# Patient Record
Sex: MALE
[De-identification: names, ages, dates, MRNs, and addresses within clinical notes are randomized per-mention and may not be internally consistent; named-entity substitution may affect disease eponyms.]

---

## 2023-08-24 ENCOUNTER — APPOINTMENT (OUTPATIENT)
Dept: SURGERY | Facility: CLINIC | Age: 38
End: 2023-08-24
Payer: COMMERCIAL

## 2023-08-24 VITALS
HEART RATE: 57 BPM | WEIGHT: 160 LBS | DIASTOLIC BLOOD PRESSURE: 70 MMHG | OXYGEN SATURATION: 98 % | HEIGHT: 70 IN | SYSTOLIC BLOOD PRESSURE: 116 MMHG | TEMPERATURE: 98.1 F | BODY MASS INDEX: 22.9 KG/M2

## 2023-08-24 DIAGNOSIS — Z78.9 OTHER SPECIFIED HEALTH STATUS: ICD-10-CM

## 2023-08-24 DIAGNOSIS — Z82.49 FAMILY HISTORY OF ISCHEMIC HEART DISEASE AND OTHER DISEASES OF THE CIRCULATORY SYSTEM: ICD-10-CM

## 2023-08-24 DIAGNOSIS — K42.9 UMBILICAL HERNIA W/OUT OBSTRUCTION OR GANGRENE: ICD-10-CM

## 2023-08-24 PROBLEM — Z00.00 ENCOUNTER FOR PREVENTIVE HEALTH EXAMINATION: Status: ACTIVE | Noted: 2023-08-24

## 2023-08-24 PROCEDURE — 99205 OFFICE O/P NEW HI 60 MIN: CPT

## 2023-08-24 RX ORDER — ALBUTEROL 90 MCG
AEROSOL (GRAM) INHALATION
Refills: 0 | Status: ACTIVE | COMMUNITY

## 2023-08-24 RX ORDER — DUTASTERIDE 0.5 MG/1
CAPSULE, LIQUID FILLED ORAL
Refills: 0 | Status: ACTIVE | COMMUNITY

## 2023-08-29 NOTE — END OF VISIT
[Time Spent: ___ minutes] : I have spent [unfilled] minutes of time on the encounter. [FreeTextEntry3] : All medical record entries made by the Scribe were at my, EMBER De Los Santos , direction and personally dictated by me on 08/24/2023 . I have reviewed the chart and agree that the record accurately reflects my personal performance of the history, physical exam, assessment and plan. I have also personally directed, reviewed, and agreed with the chart.

## 2023-08-29 NOTE — ADDENDUM
[FreeTextEntry1] : Documented by Darcie Zamorano acting as a scribe for EMBER De Los Santos on 08/24/2023

## 2023-08-29 NOTE — HISTORY OF PRESENT ILLNESS
[de-identified] : Pt is a 38-year y/o M with PMHx of asthma and PSHx of open appendectomy who presents today for initial evaluation of gallstones and a ventral hernia. Patient comes in today complaining of gallstones and abdominal hernia. States he has been managing by modifying his diet. He experiences RUQ abdominal pain. Patient has visited the ER due to onset RUQ abdominal pain at which time he had an ultrasound and was diagnosed with gallstones. Ultrasound from 2/28/23 revealed cholelithiasis without evidence of acute cholecystitis and mild intrahepatic biliary ductal dilatation. States he is on steroid therapy. On exam, a 2 cm ventral hernia was appreciated more on the right side of the umbilicus. Discussed the procedure of robotic cholecystectomy and ventral hernia repair with the patient in great detail. All the risks and benefits discussed including risks of bleeding, blood clots, infection, bile duct injury and leak. Patient understands all the risk. Will proceed and schedule robotic cholecystectomy and 2 cm ventral hernia repair w/ MESH.

## 2023-08-29 NOTE — PHYSICAL EXAM
[No Rash or Lesion] : No rash or lesion [Alert] : alert [Oriented to Person] : oriented to person [Oriented to Place] : oriented to place [Oriented to Time] : oriented to time [Calm] : calm [de-identified] : not in any acute distress [de-identified] : 2 cm ventral hernia on the right next to the umbilicus  [de-identified] : functional MSK

## 2023-08-29 NOTE — ASSESSMENT
[FreeTextEntry1] : Pt is a 38-year y/o M with PMHx of asthma and PSHx of open appendectomy who presents today for initial evaluation of gallstones and a ventral hernia. Patient comes in today complaining of gallstones and abdominal hernia. States he has been managing by modifying his diet. He experiences RUQ abdominal pain. Patient has visited the ER due to onset RUQ abdominal pain at which time he had an ultrasound and was diagnosed with gallstones. Ultrasound from 2/28/23 revealed cholelithiasis without evidence of acute cholecystitis and mild intrahepatic biliary ductal dilatation. States he is on steroid therapy. On exam, a 2 cm ventral hernia was appreciated more on the right side of the umbilicus. Discussed the procedure of robotic cholecystectomy and ventral hernia repair with the patient in great detail. All the risks and benefits discussed including risks of bleeding, blood clots, infection, bile duct injury and leak. Patient understands all the risk. Will proceed and schedule robotic cholecystectomy and 2 cm ventral hernia repair w/ MESH.